# Patient Record
Sex: MALE | Employment: OTHER | URBAN - NONMETROPOLITAN AREA
[De-identification: names, ages, dates, MRNs, and addresses within clinical notes are randomized per-mention and may not be internally consistent; named-entity substitution may affect disease eponyms.]

---

## 2017-01-01 ENCOUNTER — TELEPHONE (OUTPATIENT)
Dept: FAMILY MEDICINE CLINIC | Facility: CLINIC | Age: 77
End: 2017-01-01

## 2017-01-01 ENCOUNTER — MED REC SCAN ONLY (OUTPATIENT)
Dept: FAMILY MEDICINE CLINIC | Facility: CLINIC | Age: 77
End: 2017-01-01

## 2017-01-01 DIAGNOSIS — R62.7 FAILURE TO THRIVE IN ADULT: ICD-10-CM

## 2017-01-01 DIAGNOSIS — F02.80 DEMENTIA DUE TO PARKINSON'S DISEASE WITHOUT BEHAVIORAL DISTURBANCE (HCC): ICD-10-CM

## 2017-01-01 DIAGNOSIS — G20 DEMENTIA DUE TO PARKINSON'S DISEASE WITHOUT BEHAVIORAL DISTURBANCE (HCC): ICD-10-CM

## 2017-01-01 DIAGNOSIS — G20 PARKINSON'S DISEASE (HCC): Primary | ICD-10-CM

## 2017-01-03 NOTE — TELEPHONE ENCOUNTER
Pt's wife calls. States pt went to [facility] yesterday because he slipped/ fell out of chair while she was trying to get him cleaned up for the day. Wife was not able to get pt up off the floor, so she called 911.   Pt was scheduled for his echo today @ [facility]

## 2017-01-03 NOTE — TELEPHONE ENCOUNTER
I would proceed with getting a hospital bed  I believe Aaron was going to do the overnight oximetry, please confirm  Patient may eat as tolerated but would encourage fluids frequently  It would be good for patient to take his medication on a daily basis

## 2017-01-03 NOTE — TELEPHONE ENCOUNTER
PC to [facility] @ [phone], spoke with Karen Sahu. Karen Shau said they will take an overnight oximeter to pt on 1/4 and pick it up on 1/5. Order refaxed to her @ [phone]. Karen Sahu said wife can call to discuss hospital bed options.   Discussed the rest of inf

## 2017-01-09 NOTE — TELEPHONE ENCOUNTER
Hospice order, last ofc note, demographic sheet and insurance card faxed to [facility]

## 2017-01-11 ENCOUNTER — TELEPHONE (OUTPATIENT)
Dept: FAMILY MEDICINE CLINIC | Facility: CLINIC | Age: 77
End: 2017-01-11

## 2017-01-23 ENCOUNTER — MED REC SCAN ONLY (OUTPATIENT)
Dept: FAMILY MEDICINE CLINIC | Facility: CLINIC | Age: 77
End: 2017-01-23